# Patient Record
Sex: MALE | Race: WHITE | Employment: UNEMPLOYED | URBAN - METROPOLITAN AREA
[De-identification: names, ages, dates, MRNs, and addresses within clinical notes are randomized per-mention and may not be internally consistent; named-entity substitution may affect disease eponyms.]

---
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
## 2021-06-10 ENCOUNTER — OFFICE VISIT (OUTPATIENT)
Dept: URGENT CARE | Facility: URGENT CARE | Age: 21
End: 2021-06-10
Payer: COMMERCIAL

## 2021-06-10 VITALS
OXYGEN SATURATION: 99 % | HEART RATE: 51 BPM | TEMPERATURE: 98.2 F | SYSTOLIC BLOOD PRESSURE: 112 MMHG | WEIGHT: 175 LBS | HEIGHT: 70 IN | BODY MASS INDEX: 25.05 KG/M2 | DIASTOLIC BLOOD PRESSURE: 60 MMHG | RESPIRATION RATE: 15 BRPM

## 2021-06-10 DIAGNOSIS — R07.0 THROAT PAIN: ICD-10-CM

## 2021-06-10 DIAGNOSIS — K13.70 ORAL MUCOSAL LESION: Primary | ICD-10-CM

## 2021-06-10 LAB
DEPRECATED S PYO AG THROAT QL EIA: NEGATIVE
SPECIMEN SOURCE: NORMAL
SPECIMEN SOURCE: NORMAL
STREP GROUP A PCR: NOT DETECTED

## 2021-06-10 PROCEDURE — 99213 OFFICE O/P EST LOW 20 MIN: CPT | Performed by: FAMILY MEDICINE

## 2021-06-10 PROCEDURE — 87651 STREP A DNA AMP PROBE: CPT | Performed by: FAMILY MEDICINE

## 2021-06-10 PROCEDURE — 87529 HSV DNA AMP PROBE: CPT | Performed by: FAMILY MEDICINE

## 2021-06-10 RX ORDER — VALACYCLOVIR HYDROCHLORIDE 1 G/1
1000 TABLET, FILM COATED ORAL 2 TIMES DAILY
Qty: 14 TABLET | Refills: 0 | Status: SHIPPED | OUTPATIENT
Start: 2021-06-10 | End: 2021-06-17

## 2021-06-10 ASSESSMENT — MIFFLIN-ST. JEOR: SCORE: 1805.04

## 2021-06-10 NOTE — PROGRESS NOTES
Assessment & Plan     Throat pain  - Streptococcus A Rapid Scr w Reflx to PCR  - Group A Streptococcus PCR Throat Swab    Oral mucosal lesion  - Herpes Simplex Virus 1&2 PCR Swab  - valACYclovir (VALTREX) 1000 mg tablet  Dispense: 14 tablet; Refill: 0     Possibly this could be handmouth disease the patient has been absent of other rashes on the body additionally has had no fevers.  Given its appearance I did recommend he have a viral swab done to screen for HSV.  At this current time we will start him on Valtrex to be taken twice daily 1 g each dose for 1 week course.  Strep test was negative.  No evidence of abscess.  [id number]    Ddx: herpangina, HSV, canker sores    Chino Toney MD    [facility]    Damaris Wilson is a 21 year old male who presents to clinic today for the following health issues:  Chief Complaint   Patient presents with     Urgent Care     Pharyngitis     sore throat x 1 week.      HPI  No known exposures to sick individuals. Plays on a [employer] team  Some fatigue but no headache   Symptoms have progressed over the last week.  Has a hx of recurrent strep infection.   He is able to swallow    Remedies attempted: salt water gargling, ibuprofen  He denies fever    He is not received COVID vaccinations.     No known exposures to COVID    Denies shortness of breath/chest pain/cough.       [facility] had comprehensive testing which he believes HSV serologic screening was also done -- everything negative. No new partners since testing. In the past has performed oral intercourse on partner.     There are no active problems to display for this patient.      Current Outpatient Medications   Medication     valACYclovir (VALTREX) 1000 mg tablet     cetirizine (ZYRTEC) 10 MG tablet     triamcinolone (KENALOG) 0.1 % paste     No current facility-administered medications for this visit.            Objective    /60   Pulse 51   Temp 98.2  F (36.8  C) (Oral)   Resp 15    "Ht 1.778 m (5' 10\")   Wt 79.4 kg (175 lb)   SpO2 99%   BMI 25.11 kg/m    Physical Exam     HEENT:   Skin: no rashes of body/palms  HEENT: no enlarged tonsils, no exudates, uvula midline, posterior oropharynx and uvula shows multiple shallow ulcers            Results for orders placed or performed in visit on 06/10/21   Streptococcus A Rapid Scr w Reflx to PCR     Status: None    Specimen: Throat   Result Value Ref Range    Strep Specimen Description Throat     Streptococcus Group A Rapid Screen Negative NEG^Negative             The use of Dragon/Ad[employer] dictation services may have been used to construct the content in this note; any grammatical or spelling errors are non-intentional. Please contact the author of this note directly if you are in need of any clarification.   "

## 2021-06-10 NOTE — PATIENT INSTRUCTIONS
The test should be back within a couple days      Take the valtrex twice a day for 1 week in the event this could be a herpes outbreak      Continue the salt water gargles 3 to 4 times daily      If symptoms worsen please seek medical attention    Tylenol 325-650mg every 4-6 hours as needed for pain   --okay to take additional ibuprofen if needed

## 2021-06-11 LAB
HSV1 DNA SPEC QL NAA+PROBE: NOT DETECTED
HSV2 DNA SPEC QL NAA+PROBE: NOT DETECTED
LABORATORY COMMENT REPORT: NORMAL
SPECIMEN SOURCE: NORMAL

## 2021-06-12 ENCOUNTER — HEALTH MAINTENANCE LETTER (OUTPATIENT)
Age: 21
End: 2021-06-12

## 2021-10-03 ENCOUNTER — HEALTH MAINTENANCE LETTER (OUTPATIENT)
Age: 21
End: 2021-10-03

## 2022-07-09 ENCOUNTER — HEALTH MAINTENANCE LETTER (OUTPATIENT)
Age: 22
End: 2022-07-09

## 2022-09-04 ENCOUNTER — HEALTH MAINTENANCE LETTER (OUTPATIENT)
Age: 22
End: 2022-09-04

## 2023-07-23 ENCOUNTER — HEALTH MAINTENANCE LETTER (OUTPATIENT)
Age: 23
End: 2023-07-23